# Patient Record
Sex: FEMALE | Race: WHITE | NOT HISPANIC OR LATINO
[De-identification: names, ages, dates, MRNs, and addresses within clinical notes are randomized per-mention and may not be internally consistent; named-entity substitution may affect disease eponyms.]

---

## 2020-02-25 ENCOUNTER — NON-APPOINTMENT (OUTPATIENT)
Age: 60
End: 2020-02-25

## 2020-02-25 ENCOUNTER — APPOINTMENT (OUTPATIENT)
Dept: OPHTHALMOLOGY | Facility: CLINIC | Age: 60
End: 2020-02-25
Payer: COMMERCIAL

## 2020-02-25 PROBLEM — Z00.00 ENCOUNTER FOR PREVENTIVE HEALTH EXAMINATION: Status: ACTIVE | Noted: 2020-02-25

## 2020-02-25 PROCEDURE — 92004 COMPRE OPH EXAM NEW PT 1/>: CPT

## 2020-10-13 ENCOUNTER — TRANSCRIPTION ENCOUNTER (OUTPATIENT)
Age: 60
End: 2020-10-13

## 2020-10-13 ENCOUNTER — APPOINTMENT (OUTPATIENT)
Dept: OBGYN | Facility: CLINIC | Age: 60
End: 2020-10-13
Payer: COMMERCIAL

## 2020-10-13 VITALS
TEMPERATURE: 97.6 F | HEIGHT: 63 IN | BODY MASS INDEX: 21.62 KG/M2 | WEIGHT: 122 LBS | SYSTOLIC BLOOD PRESSURE: 130 MMHG | DIASTOLIC BLOOD PRESSURE: 90 MMHG

## 2020-10-13 DIAGNOSIS — Z12.39 ENCOUNTER FOR OTHER SCREENING FOR MALIGNANT NEOPLASM OF BREAST: ICD-10-CM

## 2020-10-13 DIAGNOSIS — Z01.419 ENCOUNTER FOR GYNECOLOGICAL EXAMINATION (GENERAL) (ROUTINE) W/OUT ABNORMAL FINDINGS: ICD-10-CM

## 2020-10-13 DIAGNOSIS — Z83.438 FAMILY HISTORY OF OTHER DISORDER OF LIPOPROTEIN METABOLISM AND OTHER LIPIDEMIA: ICD-10-CM

## 2020-10-13 DIAGNOSIS — N95.2 POSTMENOPAUSAL ATROPHIC VAGINITIS: ICD-10-CM

## 2020-10-13 DIAGNOSIS — Z78.9 OTHER SPECIFIED HEALTH STATUS: ICD-10-CM

## 2020-10-13 PROCEDURE — 99386 PREV VISIT NEW AGE 40-64: CPT

## 2020-10-13 RX ORDER — ESTRADIOL 0.1 MG/G
0.1 CREAM VAGINAL
Qty: 1 | Refills: 3 | Status: ACTIVE | COMMUNITY
Start: 2020-10-13 | End: 1900-01-01

## 2020-10-13 NOTE — COUNSELING
[Nutrition/ Exercise/ Weight Management] : nutrition, exercise, weight management [Breast Self Exam] : breast self exam [STD (testing, results, tx)] : STD (testing, results, tx)

## 2020-10-13 NOTE — DISCUSSION/SUMMARY
[FreeTextEntry1] : 59 yo presents for well woman visit, has history of vaginal atrophy\par \par HCM: pap and HPV done, breast imaging referral by PMD, colon cancer screening up to date, bone density next year\par \par Vaginal atrophy: Rx sent to pharmacy\par \par -f/u 1 year/ PRN

## 2020-10-13 NOTE — PHYSICAL EXAM
[Appropriately responsive] : appropriately responsive [No Lymphadenopathy] : no lymphadenopathy [Soft] : soft [Non-tender] : non-tender [Non-distended] : non-distended [No Lesions] : no lesions [Oriented x3] : oriented x3 [Examination Of The Breasts] : a normal appearance [No Masses] : no breast masses were palpable [Vulvar Atrophy] : vulvar atrophy [Labia Majora] : normal [Labia Minora] : normal [Atrophy] : atrophy [No Bleeding] : There was no active vaginal bleeding [Normal] : normal [Anteversion] : anteverted [Uterine Adnexae] : normal [No Tenderness] : no tenderness [Nl Sphincter Tone] : normal sphincter tone [FreeTextEntry9] : no masses, no nodules

## 2020-10-13 NOTE — HISTORY OF PRESENT ILLNESS
[Patient reported mammogram was normal] : Patient reported mammogram was normal [Patient reported PAP Smear was normal] : Patient reported PAP Smear was normal [Patient reported bone density results were normal] : Patient reported bone density results were normal [Patient reported colonoscopy was normal] : Patient reported colonoscopy was normal [postmenopausal] : postmenopausal [Y] : Patient is sexually active [FreeTextEntry1] : 61 yo presents for initial gynecological visit, she has vaginal dryness and occasional bleeding with intercourse using Estrace cream. Sexually active. She is a  and has [place] some [place].  [Mammogramdate] : 2019 [PapSmeardate] : 2019 [BoneDensityDate] : 2019 [ColonoscopyDate] : 2015 [PGHxTotal] : 3 [PGHxAbortions] : 3 [No] : Patient does not have concerns regarding sex [Currently Active] : currently active [Men] : men

## 2020-10-26 LAB — HPV HIGH+LOW RISK DNA PNL CVX: NOT DETECTED

## 2023-07-07 ENCOUNTER — NON-APPOINTMENT (OUTPATIENT)
Age: 63
End: 2023-07-07

## 2023-07-10 ENCOUNTER — NON-APPOINTMENT (OUTPATIENT)
Age: 63
End: 2023-07-10

## 2023-07-11 ENCOUNTER — NON-APPOINTMENT (OUTPATIENT)
Age: 63
End: 2023-07-11

## 2023-07-11 ENCOUNTER — APPOINTMENT (OUTPATIENT)
Dept: BREAST CENTER | Facility: CLINIC | Age: 63
End: 2023-07-11
Payer: SELF-PAY

## 2023-07-11 VITALS
DIASTOLIC BLOOD PRESSURE: 61 MMHG | OXYGEN SATURATION: 97 % | HEIGHT: 63 IN | WEIGHT: 115 LBS | SYSTOLIC BLOOD PRESSURE: 157 MMHG | BODY MASS INDEX: 20.38 KG/M2 | HEART RATE: 79 BPM

## 2023-07-11 DIAGNOSIS — Z86.39 PERSONAL HISTORY OF OTHER ENDOCRINE, NUTRITIONAL AND METABOLIC DISEASE: ICD-10-CM

## 2023-07-11 DIAGNOSIS — Z80.3 FAMILY HISTORY OF MALIGNANT NEOPLASM OF BREAST: ICD-10-CM

## 2023-07-11 DIAGNOSIS — Z78.9 OTHER SPECIFIED HEALTH STATUS: ICD-10-CM

## 2023-07-11 DIAGNOSIS — Z86.79 PERSONAL HISTORY OF OTHER DISEASES OF THE CIRCULATORY SYSTEM: ICD-10-CM

## 2023-07-11 DIAGNOSIS — R92.8 OTHER ABNORMAL AND INCONCLUSIVE FINDINGS ON DIAGNOSTIC IMAGING OF BREAST: ICD-10-CM

## 2023-07-11 PROCEDURE — 99205 OFFICE O/P NEW HI 60 MIN: CPT

## 2023-07-11 RX ORDER — AMLODIPINE BESYLATE 5 MG/1
5 TABLET ORAL
Refills: 0 | Status: ACTIVE | COMMUNITY

## 2023-07-14 ENCOUNTER — OUTPATIENT (OUTPATIENT)
Dept: OUTPATIENT SERVICES | Facility: HOSPITAL | Age: 63
LOS: 1 days | End: 2023-07-14
Payer: SELF-PAY

## 2023-07-14 DIAGNOSIS — C50.912 MALIGNANT NEOPLASM OF UNSPECIFIED SITE OF LEFT FEMALE BREAST: ICD-10-CM

## 2023-07-18 ENCOUNTER — NON-APPOINTMENT (OUTPATIENT)
Age: 63
End: 2023-07-18

## 2023-07-18 ENCOUNTER — RESULT REVIEW (OUTPATIENT)
Age: 63
End: 2023-07-18

## 2023-07-18 LAB — SURGICAL PATHOLOGY STUDY: SIGNIFICANT CHANGE UP

## 2023-07-18 PROCEDURE — 88321 CONSLTJ&REPRT SLD PREP ELSWR: CPT

## 2023-07-18 NOTE — PHYSICAL EXAM
[Normocephalic] : normocephalic [EOMI] : extra ocular movement intact [Supple] : supple [Examined in the supine and seated position] : examined in the supine and seated position [Symmetrical] : symmetrical [No dominant masses] : no dominant masses in right breast  [No dominant masses] : no dominant masses left breast [No Nipple Retraction] : no left nipple retraction [No Nipple Discharge] : no left nipple discharge [No Axillary Lymphadenopathy] : no left axillary lymphadenopathy [No Rashes] : no rashes [de-identified] : Significant ecchymoses in the 3:00 to 8:00 location of the left breast.  No obvious palpable masses noted

## 2023-07-18 NOTE — ASSESSMENT
[FreeTextEntry1] : 63-year-old female with a newly diagnosed left breast invasive ductal carcinoma, ER positive, LA positive, HER2 negative multifocal and multicentric with a family history of breast cancer

## 2023-07-18 NOTE — HISTORY OF PRESENT ILLNESS
[FreeTextEntry1] : 64 yo female who presents with her significant other, Sarath, for newly diagnosed multifocal left breast invasive ductal carcinoma (ER positive [place] negative HER2 negative) and DCIS found on screening mammogram as 2 clusters of microcalcifications in the left breast 6:00 5cmFN and 3:00 5cmFN. Of note, the patient underwent a screening mammogram in 12/2022 recommending left diagnostic views of which patient completed in 6/2023, which prompted the stereotactic biopsies yielding malignant findings.  Of note, the patient has been under a significant amount of stress as she was informed by her insurance company yesterday that her plan had dropped her.  She is undergoing the reinstating process.  I did recommend that she follow-up with our patient navigator to see if there can be any assistance with the social work.  I also discussed the diagnosis with the patient at length.  I explained the significance of breast cancer as well as her biomarkers.  I recommended bilateral breast MRI to further evaluate both breasts and the extent of disease.  The patient and her significant other understand and agree.  I did review the options of breast conservation versus mastectomy.  The patient would like to attempt for breast conservation if possible.  We did also discussed that since she has a family history of breast cancer she may qualify for genetic testing.  I discussed this with the genetic counselors and she does not meet the criteria.  The patient is not interested in paying out-of-pocket for genetic testing so we will proceed without that.\par \par TNM Staging: T1N0M0\par \par Patient reports history of right breast biopsy completed over 20 years ago with reportedly benign results. Patient reports use of topical vaginal estradiol cream started 2-3 years ago, stopped several months ago.

## 2023-07-18 NOTE — PAST MEDICAL HISTORY
[Menarche Age ____] : age at menarche was [unfilled] [Menopause Age____] : age at menopause was [unfilled] [Regular Cycle Intervals] : have been regular [Total Preg ___] : G[unfilled] [Live Births ___] : P[unfilled]  [Abortions ___] : Abortions:[unfilled] [History of Hormone Replacement Treatment] : has no history of hormone replacement treatment

## 2023-07-18 NOTE — DATA REVIEWED
[FreeTextEntry1] : 12/27/22 ([facility]) b/l screening mammo: heterogenously dense. Nonspecific microcalcifications inferior posterior left breast. Diagnostic mammography is recommended. FOLLOW-UP: Additional imaging. ASSESSMENT: BI-RADS Category 0\par \par 6/3/23 ([facility]) left diag mammo: heterogenously dense. 2 clusters of calcifications seen in the left 6:00 and 3:00 positions for which stereotactic biopsy is recommended. BI-RADS 4. \par \par 6/30/23 ([facility]) left stereo biopsy x2:1. Left breast central, with Ca++[cork clip]: ductal carcinoma in situ, low to intermediate nuclear grade, solid and cribriform patterns, with lobular involvement, necrosis and calcifications.\par Addendum: Estrogen receptor (ER) POSITIVE >90%; Progesterone receptor (CO): POSITIVE 33%\par 2. Left breast, central lower, with Ca++ [hourglass clip]: well moderately differentiated invasive ductal carcinoma, measuring 0.2 cm in maximal length in this material. Ductal carcinoma in situ, intermediate nuclear grade, solid pattern, with necrosis and calcifications also present. Benign glandular calcification also present.\par Addendum: Estrogen receptor (ER) POSITIVE >90% Progesterone receptor (CO) NEGATIVE <1% HER-2 wang: 1+ NEGATIVE Ki67 1%\par Recommend definitive surgical and oncological management. Consider breast MRI for extent of disease.

## 2023-07-28 ENCOUNTER — TRANSCRIPTION ENCOUNTER (OUTPATIENT)
Age: 63
End: 2023-07-28

## 2023-07-28 ENCOUNTER — OUTPATIENT (OUTPATIENT)
Dept: OUTPATIENT SERVICES | Facility: HOSPITAL | Age: 63
LOS: 1 days | End: 2023-07-28
Payer: SELF-PAY

## 2023-07-28 ENCOUNTER — RESULT REVIEW (OUTPATIENT)
Age: 63
End: 2023-07-28

## 2023-07-28 ENCOUNTER — APPOINTMENT (OUTPATIENT)
Dept: MRI IMAGING | Facility: HOSPITAL | Age: 63
End: 2023-07-28

## 2023-07-28 PROCEDURE — 77049 MRI BREAST C-+ W/CAD BI: CPT | Mod: 26

## 2023-07-28 PROCEDURE — C8908: CPT

## 2023-07-28 PROCEDURE — C8937: CPT

## 2023-07-28 PROCEDURE — A9585: CPT

## 2023-07-31 ENCOUNTER — APPOINTMENT (OUTPATIENT)
Dept: BREAST CENTER | Facility: CLINIC | Age: 63
End: 2023-07-31
Payer: SELF-PAY

## 2023-07-31 ENCOUNTER — NON-APPOINTMENT (OUTPATIENT)
Age: 63
End: 2023-07-31

## 2023-07-31 VITALS
SYSTOLIC BLOOD PRESSURE: 158 MMHG | BODY MASS INDEX: 20.9 KG/M2 | HEART RATE: 72 BPM | WEIGHT: 118 LBS | DIASTOLIC BLOOD PRESSURE: 83 MMHG

## 2023-07-31 PROCEDURE — 99215 OFFICE O/P EST HI 40 MIN: CPT

## 2023-07-31 NOTE — ASSESSMENT
[FreeTextEntry1] : 63-year-old female with a newly diagnosed left breast invasive ductal carcinoma, ER positive, TX Negative, HER2 negative And left breast DCIS, ER positive, TX positive with a family history of breast cancer

## 2023-07-31 NOTE — PHYSICAL EXAM
[Normocephalic] : normocephalic [EOMI] : extra ocular movement intact [Supple] : supple [Examined in the supine and seated position] : examined in the supine and seated position [Symmetrical] : symmetrical [No dominant masses] : no dominant masses in right breast  [No dominant masses] : no dominant masses left breast [No Nipple Retraction] : no left nipple retraction [No Nipple Discharge] : no left nipple discharge [No Axillary Lymphadenopathy] : no left axillary lymphadenopathy [No Rashes] : no rashes [de-identified] :  No obvious palpable masses noted

## 2023-07-31 NOTE — DATA REVIEWED
[FreeTextEntry1] : 12/27/22 ([facility]) b/l screening mammo: heterogenously dense. Nonspecific microcalcifications inferior posterior left breast. Diagnostic mammography is recommended. FOLLOW-UP: Additional imaging. ASSESSMENT: BI-RADS Category 0  6/3/23 ([facility]) left diag mammo: heterogenously dense. 2 clusters of calcifications seen in the left 6:00 and 3:00 positions for which stereotactic biopsy is recommended. BI-RADS 4.   6/30/23 ([facility]) left stereo biopsy x2:1. Left breast central, with Ca++[cork clip]: ductal carcinoma in situ, low to intermediate nuclear grade, solid and cribriform patterns, with lobular involvement, necrosis and calcifications. Addendum: Estrogen receptor (ER) POSITIVE >90%; Progesterone receptor ([place]): POSITIVE 33% 2. Left breast, central lower, with Ca++ [hourglass clip]: well moderately differentiated invasive ductal carcinoma, measuring 0.2 cm in maximal length in this material. Ductal carcinoma in situ, intermediate nuclear grade, solid pattern, with necrosis and calcifications also present. Benign glandular calcification also present. Addendum: Estrogen receptor (ER) POSITIVE >90% Progesterone receptor (IN) NEGATIVE <1% HER-2 wang: 1+ NEGATIVE Ki67 1% Recommend definitive surgical and oncological management. Consider breast MRI for extent of disease.   7/28/23 ([facility]) b/l breast MRI: IMPRESSION: Unremarkable right breast, No adenopathy, Newly diagnosed left breast cancer, two sites. The clip to clip distance on the CC and ML views is about 2 cm. Breast conservation can be considered. Localization of the CORK and HOURGLASS clips can be performed with mammography guidance prior to surgery. RECOMMENDATION: Surgical or oncologic management. BI-RADS: BI-RADS 6- Known Biopsy-Proven Malignancy.

## 2023-07-31 NOTE — HISTORY OF PRESENT ILLNESS
[FreeTextEntry1] : 62 yo female who presents with her significant other, Sarath, for follow up of newly diagnosed multifocal left breast invasive ductal carcinoma (ER positive [place] negative HER2 negative) and DCIS found on screening mammogram as 2 clusters of microcalcifications in the left breast 6:00 5cmFN and 3:00 5cmFN. The patient underwent a bilateral screening mammogram on 12/27/2022.  She was found to have dense breast tissue.  Additional imaging was recommended.  She underwent left breast diagnostic mammogram and sonogram on 6/3/2023.  She was found to have 2 clusters of calcifications in the left 6:00 and 3:00 positions for which stereotactic core biopsy is recommended.  She underwent left breast stereotactic core biopsy on 6/30/2023.  The central breast returned as ductal carcinoma in situ, ER positive, CA positive.  At the left breast central lower area returned as invasive ductal carcinoma measuring 0.2 cm, ER positive, CA negative, HER2 negative.  Bilateral breast MRI was performed on 7/28/2023.  There were no additional areas of enhancement in either breast and no axillary adenopathy.  The clip to clip distance was found to be about 2 cm in breast conservation was found to be amenable. Patient reports history of right breast biopsy completed over 20 years ago with reportedly benign results. Patient reports use of topical vaginal estradiol cream started 2-3 years ago, stopped several months ago. She does have a family history of a maternal grandmother with breast cancer diagnosed at the age of 85.  On discussion with the genetic counseling she did not qualify for genetic testing even with her new diagnosis.  I discussed the diagnosis with the patient at length. We discussed the surgical options of breast conservation with lumpectomy with or without sentinel lymph node biopsy versus mastectomy with sentinel lymph node biopsy and possible axillary lymph node dissection. I explained the risks and benefits of the procedures, including but not limited to bleeding, infection, cancer recurrence, need for additional procedures, skin necrosis, loss of sensation, limitation of upper extremity range of motion, and lymphedema.     EARNESTINE DESTIN has decided on proceeding with a Left breast lumpectomy with preoperative localization and sentinel lymph node biopsy. EARNESTINE understands and agrees that a postoperative consultation with radiation oncology and medical oncology is required as they may need to undergo postoperative radiation, chemotherapy, and/or hormonal therapy. The patient has no further questions at this time.  Of note, the patient has been under a significant amount of stress as she was informed by her insurance company yesterday that her plan had dropped her.  She is undergoing the reinstating process and was connected with nurse navigation to assist in coordination with social work for assistance. The patient states that she has a trip planned in the middle of August that she would like to plan her surgery around.    TNM Staging: T1N0M0

## 2023-08-30 ENCOUNTER — NON-APPOINTMENT (OUTPATIENT)
Age: 63
End: 2023-08-30

## 2023-09-05 ENCOUNTER — APPOINTMENT (OUTPATIENT)
Dept: NUCLEAR MEDICINE | Facility: HOSPITAL | Age: 63
End: 2023-09-05
Payer: COMMERCIAL

## 2023-09-05 ENCOUNTER — RESULT REVIEW (OUTPATIENT)
Age: 63
End: 2023-09-05

## 2023-09-05 ENCOUNTER — OUTPATIENT (OUTPATIENT)
Dept: OUTPATIENT SERVICES | Facility: HOSPITAL | Age: 63
LOS: 1 days | End: 2023-09-05
Payer: COMMERCIAL

## 2023-09-05 PROCEDURE — 78195 LYMPH SYSTEM IMAGING: CPT | Mod: 26

## 2023-09-05 PROCEDURE — 78195 LYMPH SYSTEM IMAGING: CPT

## 2023-09-05 PROCEDURE — A9541: CPT

## 2023-09-06 ENCOUNTER — APPOINTMENT (OUTPATIENT)
Dept: MAMMOGRAPHY | Facility: CLINIC | Age: 63
End: 2023-09-06
Payer: COMMERCIAL

## 2023-09-06 ENCOUNTER — RESULT REVIEW (OUTPATIENT)
Age: 63
End: 2023-09-06

## 2023-09-06 ENCOUNTER — OUTPATIENT (OUTPATIENT)
Dept: OUTPATIENT SERVICES | Facility: HOSPITAL | Age: 63
LOS: 1 days | End: 2023-09-06

## 2023-09-06 ENCOUNTER — APPOINTMENT (OUTPATIENT)
Age: 63
End: 2023-09-06
Payer: COMMERCIAL

## 2023-09-06 PROCEDURE — 19282 PERQ DEVICE BREAST EA IMAG: CPT | Mod: LT

## 2023-09-06 PROCEDURE — 38525 BIOPSY/REMOVAL LYMPH NODES: CPT | Mod: LT

## 2023-09-06 PROCEDURE — 14000 TIS TRNFR TRUNK 10 SQ CM/<: CPT | Mod: LT

## 2023-09-06 PROCEDURE — 19281 PERQ DEVICE BREAST 1ST IMAG: CPT | Mod: LT

## 2023-09-06 PROCEDURE — 19301 PARTIAL MASTECTOMY: CPT | Mod: LT

## 2023-09-06 PROCEDURE — 76098 X-RAY EXAM SURGICAL SPECIMEN: CPT | Mod: 26

## 2023-09-06 PROCEDURE — 38900 IO MAP OF SENT LYMPH NODE: CPT | Mod: LT

## 2023-09-06 RX ORDER — OXYCODONE AND ACETAMINOPHEN 5; 325 MG/1; MG/1
1 TABLET ORAL
Qty: 10 | Refills: 0
Start: 2023-09-06 | End: 2023-09-07

## 2023-09-07 ENCOUNTER — RESULT REVIEW (OUTPATIENT)
Age: 63
End: 2023-09-07

## 2023-09-07 PROCEDURE — 88307 TISSUE EXAM BY PATHOLOGIST: CPT | Mod: 26

## 2023-09-07 PROCEDURE — 88305 TISSUE EXAM BY PATHOLOGIST: CPT | Mod: 26

## 2023-09-12 ENCOUNTER — NON-APPOINTMENT (OUTPATIENT)
Age: 63
End: 2023-09-12

## 2023-09-12 LAB — SURGICAL PATHOLOGY STUDY: SIGNIFICANT CHANGE UP

## 2023-09-18 ENCOUNTER — APPOINTMENT (OUTPATIENT)
Dept: BREAST CENTER | Facility: CLINIC | Age: 63
End: 2023-09-18
Payer: COMMERCIAL

## 2023-09-18 VITALS
WEIGHT: 117 LBS | HEART RATE: 81 BPM | HEIGHT: 63 IN | SYSTOLIC BLOOD PRESSURE: 131 MMHG | BODY MASS INDEX: 20.73 KG/M2 | DIASTOLIC BLOOD PRESSURE: 79 MMHG

## 2023-09-18 PROCEDURE — 99024 POSTOP FOLLOW-UP VISIT: CPT

## 2023-09-20 ENCOUNTER — APPOINTMENT (OUTPATIENT)
Dept: RADIATION ONCOLOGY | Facility: CLINIC | Age: 63
End: 2023-09-20
Payer: COMMERCIAL

## 2023-09-20 VITALS
OXYGEN SATURATION: 97 % | RESPIRATION RATE: 16 BRPM | BODY MASS INDEX: 20.73 KG/M2 | HEIGHT: 63 IN | WEIGHT: 117 LBS | SYSTOLIC BLOOD PRESSURE: 128 MMHG | DIASTOLIC BLOOD PRESSURE: 82 MMHG | HEART RATE: 74 BPM | TEMPERATURE: 98.3 F

## 2023-09-20 PROCEDURE — 99205 OFFICE O/P NEW HI 60 MIN: CPT

## 2023-09-28 ENCOUNTER — APPOINTMENT (OUTPATIENT)
Dept: HEMATOLOGY ONCOLOGY | Facility: CLINIC | Age: 63
End: 2023-09-28
Payer: COMMERCIAL

## 2023-09-28 VITALS
DIASTOLIC BLOOD PRESSURE: 77 MMHG | TEMPERATURE: 97.1 F | OXYGEN SATURATION: 98 % | BODY MASS INDEX: 20.73 KG/M2 | RESPIRATION RATE: 18 BRPM | HEART RATE: 75 BPM | HEIGHT: 63 IN | SYSTOLIC BLOOD PRESSURE: 123 MMHG | WEIGHT: 117 LBS

## 2023-09-28 PROCEDURE — 99204 OFFICE O/P NEW MOD 45 MIN: CPT

## 2023-09-29 ENCOUNTER — NON-APPOINTMENT (OUTPATIENT)
Age: 63
End: 2023-09-29

## 2023-10-13 ENCOUNTER — NON-APPOINTMENT (OUTPATIENT)
Age: 63
End: 2023-10-13

## 2023-10-18 ENCOUNTER — NON-APPOINTMENT (OUTPATIENT)
Age: 63
End: 2023-10-18

## 2023-10-20 ENCOUNTER — NON-APPOINTMENT (OUTPATIENT)
Age: 63
End: 2023-10-20

## 2023-10-23 ENCOUNTER — NON-APPOINTMENT (OUTPATIENT)
Age: 63
End: 2023-10-23

## 2023-10-26 ENCOUNTER — NON-APPOINTMENT (OUTPATIENT)
Age: 63
End: 2023-10-26

## 2023-11-01 ENCOUNTER — NON-APPOINTMENT (OUTPATIENT)
Age: 63
End: 2023-11-01

## 2023-11-22 ENCOUNTER — APPOINTMENT (OUTPATIENT)
Dept: HEMATOLOGY ONCOLOGY | Facility: CLINIC | Age: 63
End: 2023-11-22

## 2023-12-11 ENCOUNTER — NON-APPOINTMENT (OUTPATIENT)
Age: 63
End: 2023-12-11

## 2023-12-18 ENCOUNTER — APPOINTMENT (OUTPATIENT)
Dept: BREAST CENTER | Facility: CLINIC | Age: 63
End: 2023-12-18
Payer: COMMERCIAL

## 2023-12-18 VITALS
DIASTOLIC BLOOD PRESSURE: 80 MMHG | WEIGHT: 115 LBS | HEART RATE: 77 BPM | BODY MASS INDEX: 20.38 KG/M2 | HEIGHT: 63 IN | SYSTOLIC BLOOD PRESSURE: 134 MMHG

## 2023-12-18 PROCEDURE — 99213 OFFICE O/P EST LOW 20 MIN: CPT

## 2023-12-18 NOTE — PLAN
[TextEntry] : Bilateral diagnostic mammogram and sonogram to be done in May 2024 Patient perform self breast exams as instructed in the office Patient to continue to follow-up with radiation oncology as well as medical oncology Patient to follow-up in May 2024 after imaging completed

## 2023-12-18 NOTE — PHYSICAL EXAM
[Normocephalic] : normocephalic [EOMI] : extra ocular movement intact [Supple] : supple [Examined in the supine and seated position] : examined in the supine and seated position [Symmetrical] : symmetrical [No dominant masses] : no dominant masses in right breast  [No dominant masses] : no dominant masses left breast [No Nipple Retraction] : no left nipple retraction [No Nipple Discharge] : no left nipple discharge [No Axillary Lymphadenopathy] : no left axillary lymphadenopathy [No Rashes] : no rashes [No Supraclavicular Adenopathy] : no supraclavicular adenopathy [de-identified] : Well-healed periareolar lumpectomy incision as well as axillary incision

## 2023-12-18 NOTE — ASSESSMENT
[FreeTextEntry1] : 63-year-old female with personal history of left breast DCIS status postlumpectomy and September 2023

## 2023-12-18 NOTE — DATA REVIEWED
[FreeTextEntry1] : 12/27/22 ([facility]) b/l screening mammo: heterogenously dense. Nonspecific microcalcifications inferior posterior left breast. Diagnostic mammography is recommended. FOLLOW-UP: Additional imaging. ASSESSMENT: BI-RADS Category 0  6/3/23 (R) left diag mammo: heterogenously dense. 2 clusters of calcifications seen in the left 6:00 and 3:00 positions for which stereotactic biopsy is recommended. BI-RADS 4.   6/30/23 ([facility]) left stereo biopsy x2:1. Left breast central, with Ca++[cork clip]: ductal carcinoma in situ, low to intermediate nuclear grade, solid and cribriform patterns, with lobular involvement, necrosis and calcifications. Addendum: Estrogen receptor (ER) POSITIVE >90%; Progesterone receptor ([place]): POSITIVE 33% 2. Left breast, central lower, with Ca++ [hourglass clip]: well moderately differentiated invasive ductal carcinoma, measuring 0.2 cm in maximal length in this material. Ductal carcinoma in situ, intermediate nuclear grade, solid pattern, with necrosis and calcifications also present. Benign glandular calcification also present. Addendum: Estrogen receptor (ER) POSITIVE >90% Progesterone receptor ([place]) NEGATIVE <1% HER-2 wang: 1+ NEGATIVE Ki67 1% Recommend definitive surgical and oncological management. Consider breast MRI for extent of disease.   7/28/23 ([facility]) b/l breast MRI: IMPRESSION: Unremarkable right breast, No adenopathy, Newly diagnosed left breast cancer, two sites. The clip to clip distance on the CC and ML views is about 2 cm. Breast conservation can be considered. Localization of the CORK and HOURGLASS clips can be performed with mammography guidance prior to surgery. RECOMMENDATION: Surgical or oncologic management. BI-RADS: BI-RADS 6- Known Biopsy-Proven Malignancy.

## 2023-12-18 NOTE — HISTORY OF PRESENT ILLNESS
[FreeTextEntry1] : 64 yo female who presents with her significant other, Sarath, for follow-up of multifocal left breast invasive ductal carcinoma (ER positive [place] negative HER2 negative) and DCIS s/p left breast lumpectomy x 2, left SLNB on 9/6/23 final surgical pathology yielded no residual invasive carcinoma, scattered foci of DCIS, 0/1 lymph node negative for tumor. [id number]. The patient is under the radiation oncology care of Dr. Holbrook she completed XRT to the left breast on 11/1/2023. Patient is under the medical oncology care of Dr. Cano and discussed the role of adjuvant endocrine therapy which was recommended for patient to start following radiation therapy. Patient has not started on the endocrine therapy as she is on the fence regarding taking it.  She was encouraged to do so.  She was advised to follow-up with Dr. Cano regarding this.  Discussed with the patient that given her last bilateral screening was in 12/2022 and she is recently status post radiation therapy recommend patient proceed with her bilateral diagnostic mammogram/US in March 2024 however patient will be away in [place] until May so she will proceed with her imaging at that time. Patient is looking for a new gynecologist to establish care with I have provided her with the name of Dr. Karmen Del Angel.   Cancer History: left breast cancer found on screening mammogram as 2 clusters of microcalcifications in the left breast 6:00 5cmFN and 3:00 5cmFN. The patient underwent a bilateral screening mammogram on 12/27/2022. She was found to have dense breast tissue. Additional imaging was recommended. She underwent left breast diagnostic mammogram and sonogram on 6/3/2023. She was found to have 2 clusters of calcifications in the left 6:00 and 3:00 positions for which stereotactic core biopsy is recommended. She underwent left breast stereotactic core biopsy on 6/30/2023. The central breast returned as ductal carcinoma in situ, ER positive, AZ positive. At the left breast central lower area returned as invasive ductal carcinoma measuring 0.2 cm, ER positive, AZ negative, HER2 negative. Bilateral breast MRI was performed on 7/28/2023. There were no additional areas of enhancement in either breast and no axillary adenopathy. The clip to clip distance was found to be about 2 cm in breast conservation was found to be amenable. Patient reports history of right breast biopsy completed over 20 years ago with reportedly benign results. Patient reports use of topical vaginal estradiol cream started 2-3 years ago, stopped several months ago. She does have a family history of a maternal grandmother with breast cancer diagnosed at the age of 85. On discussion with the genetic counseling she did not qualify for genetic testing even with her new diagnosis. She underwent left breast lumpectomy and sentinel lymph node biopsy on 9/6/2023. Final pathology returned as TNM Staging: T1N0M0.

## 2023-12-19 ENCOUNTER — APPOINTMENT (OUTPATIENT)
Dept: RADIATION ONCOLOGY | Facility: CLINIC | Age: 63
End: 2023-12-19

## 2024-02-25 NOTE — DISEASE MANAGEMENT
[TTNM] : is [NTNM] : 0(sn) [MTNM] : X [de-identified] : [id number] [de-identified] : [id number] [de-identified] : left breast

## 2024-02-25 NOTE — HISTORY OF PRESENT ILLNESS
[FreeTextEntry1] : 64 y/o F newly diagnosed with LEFT ER+/CO-/HER2- IDC and ER/CO+ DCIS ([id number]) s/p 9/7/23 L BCS (2mm total disease, G1-2, DCIS+, margins-, 0/1 SN) presents per Dr. Mena to discuss radiation therapy.   11/1/23: Final OTV 3471cGy/4005cGy, 13/15fx to Left breast. Patient states she continues to have mild fatigue at the end of the day. She reports mild tenderness and erythema in the left breast. She is using the creams. She reports a period of nausea and dehydration yesterday that lasted 10 minutes and was relieved with hydration. Continue radiation.   10/18/23: OTV 801Gy/ 3fx to left breast.  Patient reports good energy level and appetite, denies fatigue. Skin is intact, patient not yet using cream. Encouraged patient to used skin lotion at site BID with mometasone (prescribed). Continue radiation.   9/20/2023: Consultation Today the patient reports feeling well overall. Notes tenderness in L axilla following aspiration of seroma on 9/18/23 with breast surg. She denies fever, chills, N/V, or loss of appetite. Otherwise denies appreciable breast symptoms.   History of Present Illness: ________________________________  12/27/22 ([facility]) b/l screening mammo: heterogenously dense. Nonspecific microcalcifications inferior posterior left breast. Diagnostic mammography is recommended. FOLLOW-UP: Additional imaging. ASSESSMENT: BI-RADS Category 0  6/3/23 ([facility]) left diag mammo: heterogenously dense. 2 clusters of calcifications seen in the left 6:00 and 3:00 positions for which stereotactic biopsy is recommended. BI-RADS 4.  6/30/23 ([facility]) left stereo biopsy x2:1. Left breast central, with Ca++[cork clip]: ductal carcinoma in situ, low to intermediate nuclear grade, solid and cribriform patterns, with lobular involvement, necrosis and calcifications. Addendum: Estrogen receptor (ER) POSITIVE >90%; Progesterone receptor (CO): POSITIVE 33% 2. Left breast, central lower, with Ca++ [hourglass clip]: well moderately differentiated invasive ductal carcinoma, measuring 0.2 cm in maximal length in this material. Ductal carcinoma in situ, intermediate nuclear grade, solid pattern, with necrosis and calcifications also present. Benign glandular calcification also present. Addendum: Estrogen receptor (ER) POSITIVE >90% Progesterone receptor (CO) NEGATIVE <1% HER-2 wang: 1+ NEGATIVE Ki67 1% Recommend definitive surgical and oncological management. Consider breast MRI for extent of disease.  7/28/23 ([facility]) b/l breast MRI: IMPRESSION: Unremarkable right breast, No adenopathy, Newly diagnosed left breast cancer, two sites. The clip to clip distance on the CC and ML views is about 2 cm. Breast conservation can be considered. Localization of the CORK and HOURGLASS clips can be performed with mammography guidance prior to surgery. RECOMMENDATION: Surgical or oncologic management. BI-RADS: BI-RADS 6- Known Biopsy-Proven Malignancy.  9/6/23 left breast lumpectomy x 2, left SLNB; final surgical pathology yielded no residual invasive carcinoma, scattered foci of DCIS, 0/1 lymph node negative for tumor. [id number].   9/18/23 in-office aspiration of L axilla seroma

## 2024-05-13 ENCOUNTER — APPOINTMENT (OUTPATIENT)
Dept: BREAST CENTER | Facility: CLINIC | Age: 64
End: 2024-05-13
Payer: MEDICARE

## 2024-05-13 VITALS
HEIGHT: 63 IN | HEART RATE: 60 BPM | WEIGHT: 120 LBS | DIASTOLIC BLOOD PRESSURE: 70 MMHG | BODY MASS INDEX: 21.26 KG/M2 | SYSTOLIC BLOOD PRESSURE: 136 MMHG

## 2024-05-13 DIAGNOSIS — Z08 ENCOUNTER FOR FOLLOW-UP EXAMINATION AFTER COMPLETED TREATMENT FOR MALIGNANT NEOPLASM: ICD-10-CM

## 2024-05-13 DIAGNOSIS — Z85.3 ENCOUNTER FOR FOLLOW-UP EXAMINATION AFTER COMPLETED TREATMENT FOR MALIGNANT NEOPLASM: ICD-10-CM

## 2024-05-13 DIAGNOSIS — Z85.3 PERSONAL HISTORY OF MALIGNANT NEOPLASM OF BREAST: ICD-10-CM

## 2024-05-13 PROCEDURE — 99213 OFFICE O/P EST LOW 20 MIN: CPT

## 2024-05-13 RX ORDER — FLUVOXAMINE MALEATE 25 MG/1
25 TABLET, FILM COATED ORAL
Refills: 0 | Status: DISCONTINUED | COMMUNITY
End: 2024-05-13

## 2024-05-13 RX ORDER — MOMETASONE FUROATE 1 MG/G
0.1 CREAM TOPICAL
Qty: 1 | Refills: 2 | Status: DISCONTINUED | COMMUNITY
Start: 2023-10-18 | End: 2024-05-13

## 2024-05-13 NOTE — DATA REVIEWED
[FreeTextEntry1] : 12/27/22 ([facility]) b/l screening mammo: heterogenously dense. Nonspecific microcalcifications inferior posterior left breast. Diagnostic mammography is recommended. FOLLOW-UP: Additional imaging. ASSESSMENT: BI-RADS Category 0  6/3/23 ([facility]) left diag mammo: heterogenously dense. 2 clusters of calcifications seen in the left 6:00 and 3:00 positions for which stereotactic biopsy is recommended. BI-RADS 4.   6/30/23 ([facility]) left stereo biopsy x2:1. Left breast central, with Ca++[cork clip]: ductal carcinoma in situ, low to intermediate nuclear grade, solid and cribriform patterns, with lobular involvement, necrosis and calcifications. Addendum: Estrogen receptor (ER) POSITIVE >90%; Progesterone receptor ([place]): POSITIVE 33% 2. Left breast, central lower, with Ca++ [hourglass clip]: well moderately differentiated invasive ductal carcinoma, measuring 0.2 cm in maximal length in this material. Ductal carcinoma in situ, intermediate nuclear grade, solid pattern, with necrosis and calcifications also present. Benign glandular calcification also present. Addendum: Estrogen receptor (ER) POSITIVE >90% Progesterone receptor ([place]) NEGATIVE <1% HER-2 wang: 1+ NEGATIVE Ki67 1% Recommend definitive surgical and oncological management. Consider breast MRI for extent of disease.   7/28/23 ([facility]) b/l breast MRI: IMPRESSION: Unremarkable right breast, No adenopathy, Newly diagnosed left breast cancer, two sites. The clip to clip distance on the CC and ML views is about 2 cm. Breast conservation can be considered. Localization of the CORK and HOURGLASS clips can be performed with mammography guidance prior to surgery. RECOMMENDATION: Surgical or oncologic management. BI-RADS: BI-RADS 6- Known Biopsy-Proven Malignancy.   5/13/24 ([facility]) B/l diag mammo and US: heterogeneously dense. Post-lumpectomy changes on the left. No suspicous findings. BI-RADS 2.

## 2024-05-13 NOTE — PLAN
[TextEntry] : Bilateral screening mammogram and sonogram due in May 2025 Patient to continue to follow-up with medical oncology as well as radiation oncology Patient to perform self-breast exams as instructed in the office. Patient to follow-up in 1 year after imaging completed.

## 2024-05-13 NOTE — HISTORY OF PRESENT ILLNESS
[FreeTextEntry1] : 63 yo female presents with her  for follow-up of multifocal left breast invasive ductal carcinoma (ER positive VA negative HER2 negative) and DCIS s/p left breast lumpectomy x 2, left SLNB on 9/6/23.  Final surgical pathology yielded no residual invasive carcinoma, scattered foci of DCIS, 0/1 lymph node negative for tumor. [id number]. The patient is under the radiation oncology care of Dr. Holbrook she completed XRT to the left breast on 11/1/2023. Patient is under the medical oncology care of Dr. Cano and discussed the role of adjuvant endocrine therapy which was recommended for patient to start following radiation therapy. Patient has not started on the endocrine therapy as she is on the fence regarding taking it.  We discussed this at length.  I encouraged the patient to start taking the endocrine therapy.  She is still unsure at this time.  I did recommend that she follow-up with medical oncology to discuss this further.  The patient did state that she also had an endoscopy as well as a colonoscopy recently that were benign.  Breast imaging that was performed today returned as benign as seen below.  We did discuss that she has a persistent seroma which will likely be there permanently.  She does states she has occasional discomfort in the area.  I recommended warm compresses and massage.  Cancer History: left breast cancer found on screening mammogram as 2 clusters of microcalcifications in the left breast 6:00 5cmFN and 3:00 5cmFN. The patient underwent a bilateral screening mammogram on 12/27/2022. She was found to have dense breast tissue. Additional imaging was recommended. She underwent left breast diagnostic mammogram and sonogram on 6/3/2023. She was found to have 2 clusters of calcifications in the left 6:00 and 3:00 positions for which stereotactic core biopsy is recommended. She underwent left breast stereotactic core biopsy on 6/30/2023. The central breast returned as ductal carcinoma in situ, ER positive, VA positive. At the left breast central lower area returned as invasive ductal carcinoma measuring 0.2 cm, ER positive, VA negative, HER2 negative. Bilateral breast MRI was performed on 7/28/2023. There were no additional areas of enhancement in either breast and no axillary adenopathy. The clip to clip distance was found to be about 2 cm in breast conservation was found to be amenable. Patient reports history of right breast biopsy completed over 20 years ago with reportedly benign results. Patient reports use of topical vaginal estradiol cream started 2-3 years ago, stopped several months ago. She does have a family history of a maternal grandmother with breast cancer diagnosed at the age of 85. On discussion with the genetic counseling she did not qualify for genetic testing even with her new diagnosis. She underwent left breast lumpectomy and sentinel lymph node biopsy on 9/6/2023. Final pathology returned as TNM Staging: [id number].

## 2024-05-13 NOTE — PHYSICAL EXAM
[Normocephalic] : normocephalic [EOMI] : extra ocular movement intact [Supple] : supple [No Supraclavicular Adenopathy] : no supraclavicular adenopathy [Examined in the supine and seated position] : examined in the supine and seated position [Symmetrical] : symmetrical [No dominant masses] : no dominant masses in right breast  [No dominant masses] : no dominant masses left breast [No Nipple Retraction] : no left nipple retraction [No Nipple Discharge] : no left nipple discharge [No Axillary Lymphadenopathy] : no left axillary lymphadenopathy [No Rashes] : no rashes [de-identified] : Well-healed periareolar lumpectomy incision as well as axillary incision

## 2024-06-12 ENCOUNTER — APPOINTMENT (OUTPATIENT)
Dept: HEMATOLOGY ONCOLOGY | Facility: CLINIC | Age: 64
End: 2024-06-12
Payer: MEDICARE

## 2024-06-12 VITALS
RESPIRATION RATE: 18 BRPM | SYSTOLIC BLOOD PRESSURE: 117 MMHG | HEART RATE: 82 BPM | WEIGHT: 118 LBS | OXYGEN SATURATION: 97 % | DIASTOLIC BLOOD PRESSURE: 70 MMHG | TEMPERATURE: 98.6 F | BODY MASS INDEX: 20.91 KG/M2 | HEIGHT: 63 IN

## 2024-06-12 DIAGNOSIS — C50.912 MALIGNANT NEOPLASM OF UNSPECIFIED SITE OF LEFT FEMALE BREAST: ICD-10-CM

## 2024-06-12 DIAGNOSIS — Z98.890 OTHER SPECIFIED POSTPROCEDURAL STATES: ICD-10-CM

## 2024-06-12 PROCEDURE — 99203 OFFICE O/P NEW LOW 30 MIN: CPT

## 2024-06-12 RX ORDER — TAMOXIFEN CITRATE 20 MG/1
20 TABLET, FILM COATED ORAL DAILY
Qty: 90 | Refills: 3 | Status: ACTIVE | COMMUNITY
Start: 2024-06-12 | End: 1900-01-01

## 2024-06-12 RX ORDER — FLUVOXAMINE MALEATE 25 MG/1
25 TABLET, FILM COATED ORAL
Qty: 30 | Refills: 0 | Status: ACTIVE | COMMUNITY
Start: 2024-06-12

## 2024-06-12 NOTE — ASSESSMENT
[FreeTextEntry1] : 64 yo female who presents with her significant other, Sarath, for initial consult of multifocal left breast invasive ductal carcinoma (ER >90% positive SD <1% negative HER2 1+ negative) and DCIS s/p left breast lumpectomy x 2, left SLNB on 9/6/23 final surgical pathology resulted in no residual invasive carcinoma, scattered foci of DCIS, 0/1 lymph node negative for tumor.  pT1a pN0.  Reviewed surgical pathology result with patient in detail recommending adjuvant endocrine therapy and reviewing the fact that for such a small invasive breast cancer chemotherapy is not recommended. I discussed that given a small amount of invasive hormone receptor positive disease there is a small risk of distant disease recurrence and adjuvant care is recommended to reduce risk of distant recurrence. I recommended antiestrogen therapy with an aromatase inhibitor vs Tamoxifen given reported history of osteoporosis which remains untreated. Discussed the two different antiestrogen therapy options and their respective risks (Tamoxifen with small risk of VTE, cataracts, and endometrial ca while helping improve bone density vs Arimidex which can worsen bone density with both agents associated with menopausal symptoms).  Also reviewed that the use of topical vaginal estradiol cream is controversial given a degree of systemic absorption. advised on limited use if absolutely necessary and ideally considering alternative agents/lubricants.  Pt reports DEXA within past 1.5yr and being diagnosed with osteoporosis; she is against AI, declines treatment with Arimidex. I reviewed that next best option would be Tamoxifen which does not reduce bone density but can be associated with menopausal symptoms such as hot flashes and a small risk of VTE, cataracts and endometrial ca for which patient needs to be seen for an annual exam with a GYN and ophthalmologist.   Patient in agreement to proceed with Tamoxifen 20mg/day, reviewed that treatment is for five years. (Tamoxifen does not interact with patients antidepressant, ok to continue antidepressant she is currently on)  5/2024 mammo diagnostic b/l Birads 2. Asked pt to continue regular follow ups with her PMD and breast surgeon.  Pt reports recent colonoscopy in 2024 ok as per patient.   RTC in 2-3 mo for follow up with Dr Turner (as I'm leaving the practice).  person

## 2024-06-12 NOTE — HISTORY OF PRESENT ILLNESS
[FreeTextEntry1] : 65 yo female who presents with her significant other, Sarath, for initial consult of multifocal left breast invasive ductal carcinoma (ER >90% positive DC <1% negative HER2 1+ negative) and DCIS s/p left breast lumpectomy x 2, left SLNB on 9/6/23 final surgical pathology resulted in no residual invasive carcinoma, scattered foci of DCIS, 0/1 lymph node negative for tumor. Here today for f/u.   [id number].   Pt c/o mild discomfort in the left axilla and a seroma was identified on physical exam s/p aspiration.   Cancer History:  left breast cancer found on screening mammogram as 2 clusters of microcalcifications in the left breast 6:00 5cmFN and 3:00 5cmFN. The patient underwent a bilateral screening mammogram on 12/27/2022.  She was found to have dense breast tissue.  Additional imaging was recommended.  She underwent left breast diagnostic mammogram and sonogram on 6/3/2023.  She was found to have 2 clusters of calcifications in the left 6:00 and 3:00 positions for which stereotactic core biopsy is recommended.  She underwent left breast stereotactic core biopsy on 6/30/2023.  The central breast returned as ductal carcinoma in situ, ER positive, DC positive.  At the left breast central lower area returned as invasive ductal carcinoma measuring 0.2 cm, ER positive, DC negative, HER2 negative.  Bilateral breast MRI was performed on 7/28/2023.  There were no additional areas of enhancement in either breast and no axillary adenopathy.  The clip to clip distance was found to be about 2 cm in breast conservation was found to be amenable. Patient reports history of right breast biopsy completed over 20 years ago with reportedly benign results.  Of note, patient uses a topical vaginal estradiol cream on and off over the course of past 2-3 years ago.   FHx - maternal grandmother with breast cancer diagnosed at the age of 85.  On discussion with the genetic counseling she did not qualify for genetic testing even with her new diagnosis.     [de-identified] : Pt returns after being asked by her breast surgeon to reconsider Tamoxifen or an AI.  Completed adj RT in 11/2023.  Reports feeling well.  5/2024 mammo diagnostic b/l Birads 2.

## 2024-06-20 ENCOUNTER — APPOINTMENT (OUTPATIENT)
Dept: ORTHOPEDIC SURGERY | Facility: CLINIC | Age: 64
End: 2024-06-20

## 2024-10-16 DIAGNOSIS — I10 ESSENTIAL (PRIMARY) HYPERTENSION: ICD-10-CM

## 2024-10-16 DIAGNOSIS — E78.5 HYPERLIPIDEMIA, UNSPECIFIED: ICD-10-CM

## 2024-10-17 ENCOUNTER — APPOINTMENT (OUTPATIENT)
Dept: HEMATOLOGY ONCOLOGY | Facility: CLINIC | Age: 64
End: 2024-10-17
Payer: MEDICARE

## 2024-10-17 ENCOUNTER — NON-APPOINTMENT (OUTPATIENT)
Age: 64
End: 2024-10-17

## 2024-10-17 VITALS
WEIGHT: 119.25 LBS | DIASTOLIC BLOOD PRESSURE: 76 MMHG | BODY MASS INDEX: 22.23 KG/M2 | OXYGEN SATURATION: 97 % | TEMPERATURE: 98.4 F | HEART RATE: 73 BPM | RESPIRATION RATE: 18 BRPM | SYSTOLIC BLOOD PRESSURE: 128 MMHG | HEIGHT: 61.5 IN

## 2024-10-17 DIAGNOSIS — Z92.29 PERSONAL HISTORY OF OTHER DRUG THERAPY: ICD-10-CM

## 2024-10-17 DIAGNOSIS — Z85.3 ENCOUNTER FOR FOLLOW-UP EXAMINATION AFTER COMPLETED TREATMENT FOR MALIGNANT NEOPLASM: ICD-10-CM

## 2024-10-17 DIAGNOSIS — C50.912 MALIGNANT NEOPLASM OF UNSPECIFIED SITE OF LEFT FEMALE BREAST: ICD-10-CM

## 2024-10-17 DIAGNOSIS — Z08 ENCOUNTER FOR FOLLOW-UP EXAMINATION AFTER COMPLETED TREATMENT FOR MALIGNANT NEOPLASM: ICD-10-CM

## 2024-10-17 DIAGNOSIS — N95.1 MENOPAUSAL AND FEMALE CLIMACTERIC STATES: ICD-10-CM

## 2024-10-17 DIAGNOSIS — Z86.79 PERSONAL HISTORY OF OTHER DISEASES OF THE CIRCULATORY SYSTEM: ICD-10-CM

## 2024-10-17 DIAGNOSIS — M81.0 AGE-RELATED OSTEOPOROSIS W/OUT CURRENT PATHOLOGICAL FRACTURE: ICD-10-CM

## 2024-10-17 DIAGNOSIS — N95.2 POSTMENOPAUSAL ATROPHIC VAGINITIS: ICD-10-CM

## 2024-10-17 DIAGNOSIS — Z86.39 PERSONAL HISTORY OF OTHER ENDOCRINE, NUTRITIONAL AND METABOLIC DISEASE: ICD-10-CM

## 2024-10-17 DIAGNOSIS — Z98.890 OTHER SPECIFIED POSTPROCEDURAL STATES: ICD-10-CM

## 2024-10-17 PROCEDURE — G2211 COMPLEX E/M VISIT ADD ON: CPT

## 2024-10-17 PROCEDURE — 99215 OFFICE O/P EST HI 40 MIN: CPT

## 2024-10-17 PROCEDURE — G2212 PROLONG OUTPT/OFFICE VIS: CPT

## 2024-10-17 RX ORDER — ESTRADIOL 0.1 MG/G
0.1 CREAM VAGINAL
Qty: 1 | Refills: 3 | Status: ACTIVE | COMMUNITY
Start: 2024-10-17 | End: 1900-01-01

## 2024-10-19 PROBLEM — Z86.39 HISTORY OF HYPERLIPIDEMIA: Status: RESOLVED | Noted: 2020-10-13 | Resolved: 2024-10-19

## 2024-10-19 PROBLEM — M81.0 OSTEOPOROSIS WITHOUT CURRENT PATHOLOGICAL FRACTURE, UNSPECIFIED OSTEOPOROSIS TYPE: Status: ACTIVE | Noted: 2024-10-19

## 2024-10-19 PROBLEM — Z92.29 H/O TAMOXIFEN THERAPY: Status: ACTIVE | Noted: 2024-10-19

## 2024-10-19 PROBLEM — N95.1 MENOPAUSAL VAGINAL DRYNESS: Status: ACTIVE | Noted: 2024-10-19

## 2024-10-19 PROBLEM — Z86.79 HISTORY OF HYPERTENSION: Status: RESOLVED | Noted: 2023-07-11 | Resolved: 2024-10-19

## 2024-12-16 ENCOUNTER — NON-APPOINTMENT (OUTPATIENT)
Age: 64
End: 2024-12-16

## 2025-05-14 ENCOUNTER — NON-APPOINTMENT (OUTPATIENT)
Age: 65
End: 2025-05-14

## 2025-05-14 ENCOUNTER — APPOINTMENT (OUTPATIENT)
Dept: HEMATOLOGY ONCOLOGY | Facility: CLINIC | Age: 65
End: 2025-05-14
Payer: MEDICARE

## 2025-05-14 VITALS
BODY MASS INDEX: 23.3 KG/M2 | TEMPERATURE: 98.3 F | DIASTOLIC BLOOD PRESSURE: 85 MMHG | HEIGHT: 61.5 IN | HEART RATE: 71 BPM | OXYGEN SATURATION: 98 % | RESPIRATION RATE: 18 BRPM | SYSTOLIC BLOOD PRESSURE: 132 MMHG | WEIGHT: 125 LBS

## 2025-05-14 DIAGNOSIS — Z13.89 ENCOUNTER FOR SCREENING FOR OTHER DISORDER: ICD-10-CM

## 2025-05-14 DIAGNOSIS — C50.912 MALIGNANT NEOPLASM OF UNSPECIFIED SITE OF LEFT FEMALE BREAST: ICD-10-CM

## 2025-05-14 DIAGNOSIS — Z85.3 PERSONAL HISTORY OF MALIGNANT NEOPLASM OF BREAST: ICD-10-CM

## 2025-05-14 DIAGNOSIS — Z92.29 PERSONAL HISTORY OF OTHER DRUG THERAPY: ICD-10-CM

## 2025-05-14 DIAGNOSIS — N95.1 MENOPAUSAL AND FEMALE CLIMACTERIC STATES: ICD-10-CM

## 2025-05-14 DIAGNOSIS — M81.0 AGE-RELATED OSTEOPOROSIS W/OUT CURRENT PATHOLOGICAL FRACTURE: ICD-10-CM

## 2025-05-14 LAB
ALBUMIN SERPL ELPH-MCNC: 3.6 G/DL
ALP BLD-CCNC: 55 U/L
ALT SERPL-CCNC: 21 U/L
ANION GAP SERPL CALC-SCNC: 5 MMOL/L
AST SERPL-CCNC: 26 U/L
BILIRUB SERPL-MCNC: 0.7 MG/DL
BUN SERPL-MCNC: 13 MG/DL
CALCIUM SERPL-MCNC: 9.4 MG/DL
CHLORIDE SERPL-SCNC: 108 MMOL/L
CO2 SERPL-SCNC: 28 MMOL/L
CREAT SERPL-MCNC: 0.8 MG/DL
EGFRCR SERPLBLD CKD-EPI 2021: 81 ML/MIN/1.73M2
GLUCOSE SERPL-MCNC: 90 MG/DL
HCT VFR BLD CALC: 42.4 %
HGB BLD-MCNC: 14.4 G/DL
LYMPHOCYTES # BLD AUTO: 1.6 K/UL
LYMPHOCYTES NFR BLD AUTO: 37.4 %
MAN DIFF?: NO
MCHC RBC-ENTMCNC: 29.1 PG
MCHC RBC-ENTMCNC: 34 G/DL
MCV RBC AUTO: 85.8 FL
NEUTROPHILS # BLD AUTO: 2 K/UL
NEUTROPHILS NFR BLD AUTO: 47.2 %
PLATELET # BLD AUTO: 212 K/UL
POTASSIUM SERPL-SCNC: 5 MMOL/L
PROT SERPL-MCNC: 7.1 G/DL
RBC # BLD: 4.94 M/UL
RBC # FLD: 13.2 %
SODIUM SERPL-SCNC: 141 MMOL/L
WBC # FLD AUTO: 4.3 K/UL

## 2025-05-14 PROCEDURE — 36415 COLL VENOUS BLD VENIPUNCTURE: CPT

## 2025-05-14 PROCEDURE — 99215 OFFICE O/P EST HI 40 MIN: CPT | Mod: 25

## 2025-05-14 RX ORDER — ESTRADIOL 0.1 MG/G
0.1 CREAM VAGINAL
Qty: 1 | Refills: 3 | Status: ACTIVE | COMMUNITY
Start: 2025-05-14

## 2025-05-14 RX ORDER — ESTRADIOL 0.1 MG/G
0.1 CREAM VAGINAL
Qty: 1 | Refills: 6 | Status: ACTIVE | COMMUNITY
Start: 2025-05-14 | End: 1900-01-01

## 2025-05-15 ENCOUNTER — NON-APPOINTMENT (OUTPATIENT)
Age: 65
End: 2025-05-15

## 2025-05-15 LAB
25(OH)D3 SERPL-MCNC: 67.5 NG/ML
CALCIUM SERPL-MCNC: 9.3 MG/DL
PARATHYROID HORMONE INTACT: 56 PG/ML
TSH SERPL-ACNC: 0.79 UIU/ML

## 2025-05-16 ENCOUNTER — NON-APPOINTMENT (OUTPATIENT)
Age: 65
End: 2025-05-16

## 2025-05-27 ENCOUNTER — NON-APPOINTMENT (OUTPATIENT)
Age: 65
End: 2025-05-27

## 2025-05-27 ENCOUNTER — APPOINTMENT (OUTPATIENT)
Dept: BREAST CENTER | Facility: CLINIC | Age: 65
End: 2025-05-27
Payer: MEDICARE

## 2025-05-27 VITALS — WEIGHT: 122 LBS | HEIGHT: 61.5 IN | BODY MASS INDEX: 22.74 KG/M2

## 2025-05-27 DIAGNOSIS — C50.912 MALIGNANT NEOPLASM OF UNSPECIFIED SITE OF LEFT FEMALE BREAST: ICD-10-CM

## 2025-05-27 DIAGNOSIS — R92.8 OTHER ABNORMAL AND INCONCLUSIVE FINDINGS ON DIAGNOSTIC IMAGING OF BREAST: ICD-10-CM

## 2025-05-27 DIAGNOSIS — Z92.29 PERSONAL HISTORY OF OTHER DRUG THERAPY: ICD-10-CM

## 2025-05-27 DIAGNOSIS — Z08 ENCOUNTER FOR FOLLOW-UP EXAMINATION AFTER COMPLETED TREATMENT FOR MALIGNANT NEOPLASM: ICD-10-CM

## 2025-05-27 DIAGNOSIS — R92.30 DENSE BREASTS, UNSPECIFIED: ICD-10-CM

## 2025-05-27 DIAGNOSIS — Z98.890 OTHER SPECIFIED POSTPROCEDURAL STATES: ICD-10-CM

## 2025-05-27 DIAGNOSIS — Z85.3 ENCOUNTER FOR FOLLOW-UP EXAMINATION AFTER COMPLETED TREATMENT FOR MALIGNANT NEOPLASM: ICD-10-CM

## 2025-05-27 DIAGNOSIS — Z85.3 PERSONAL HISTORY OF MALIGNANT NEOPLASM OF BREAST: ICD-10-CM

## 2025-05-27 PROCEDURE — 99213 OFFICE O/P EST LOW 20 MIN: CPT

## 2025-05-27 RX ORDER — ASCORBIC ACID 500 MG
TABLET ORAL
Refills: 0 | Status: ACTIVE | COMMUNITY

## 2025-06-11 ENCOUNTER — RX RENEWAL (OUTPATIENT)
Age: 65
End: 2025-06-11

## 2025-08-04 RX ORDER — TAMOXIFEN CITRATE 20 MG/1
20 TABLET, FILM COATED ORAL
Qty: 30 | Refills: 0 | Status: ACTIVE | COMMUNITY
Start: 2025-08-04 | End: 1900-01-01

## 2025-08-13 ENCOUNTER — APPOINTMENT (OUTPATIENT)
Dept: HEMATOLOGY ONCOLOGY | Facility: CLINIC | Age: 65
End: 2025-08-13

## 2025-08-25 ENCOUNTER — NON-APPOINTMENT (OUTPATIENT)
Age: 65
End: 2025-08-25

## 2025-08-27 ENCOUNTER — RX CHANGE (OUTPATIENT)
Age: 65
End: 2025-08-27

## 2025-08-27 RX ORDER — TAMOXIFEN CITRATE 20 MG/1
20 TABLET, FILM COATED ORAL
Qty: 90 | Refills: 1 | Status: ACTIVE | COMMUNITY
Start: 1900-01-01 | End: 1900-01-01

## 2025-09-03 ENCOUNTER — APPOINTMENT (OUTPATIENT)
Dept: ENDOCRINOLOGY | Facility: CLINIC | Age: 65
End: 2025-09-03
Payer: MEDICARE

## 2025-09-03 ENCOUNTER — NON-APPOINTMENT (OUTPATIENT)
Age: 65
End: 2025-09-03

## 2025-09-03 VITALS
DIASTOLIC BLOOD PRESSURE: 80 MMHG | WEIGHT: 123 LBS | SYSTOLIC BLOOD PRESSURE: 126 MMHG | BODY MASS INDEX: 22.86 KG/M2 | HEART RATE: 81 BPM

## 2025-09-03 DIAGNOSIS — M81.0 AGE-RELATED OSTEOPOROSIS W/OUT CURRENT PATHOLOGICAL FRACTURE: ICD-10-CM

## 2025-09-03 PROCEDURE — 99205 OFFICE O/P NEW HI 60 MIN: CPT | Mod: 25

## 2025-09-03 PROCEDURE — 36415 COLL VENOUS BLD VENIPUNCTURE: CPT

## 2025-09-04 ENCOUNTER — APPOINTMENT (OUTPATIENT)
Dept: DERMATOLOGY | Facility: CLINIC | Age: 65
End: 2025-09-04
Payer: MEDICARE

## 2025-09-04 DIAGNOSIS — L82.1 OTHER SEBORRHEIC KERATOSIS: ICD-10-CM

## 2025-09-04 DIAGNOSIS — D22.9 MELANOCYTIC NEVI, UNSPECIFIED: ICD-10-CM

## 2025-09-04 LAB
25(OH)D3 SERPL-MCNC: 51.1 NG/ML
ALBUMIN SERPL ELPH-MCNC: 4.5 G/DL
ALP BLD-CCNC: 74 U/L
ALT SERPL-CCNC: 27 U/L
ANION GAP SERPL CALC-SCNC: 16 MMOL/L
AST SERPL-CCNC: 33 U/L
BILIRUB SERPL-MCNC: 0.3 MG/DL
BUN SERPL-MCNC: 16 MG/DL
CALCIUM SERPL-MCNC: 9.7 MG/DL
CHLORIDE SERPL-SCNC: 104 MMOL/L
CO2 SERPL-SCNC: 24 MMOL/L
CREAT SERPL-MCNC: 0.87 MG/DL
EGFRCR SERPLBLD CKD-EPI 2021: 73 ML/MIN/1.73M2
GLUCOSE SERPL-MCNC: 101 MG/DL
POTASSIUM SERPL-SCNC: 5.2 MMOL/L
PROT SERPL-MCNC: 7.4 G/DL
SODIUM SERPL-SCNC: 145 MMOL/L
TSH SERPL-ACNC: 0.38 UIU/ML

## 2025-09-04 PROCEDURE — 99203 OFFICE O/P NEW LOW 30 MIN: CPT

## 2025-09-10 ENCOUNTER — NON-APPOINTMENT (OUTPATIENT)
Age: 65
End: 2025-09-10

## 2025-09-16 ENCOUNTER — APPOINTMENT (OUTPATIENT)
Dept: HEMATOLOGY ONCOLOGY | Facility: CLINIC | Age: 65
End: 2025-09-16
Payer: MEDICARE

## 2025-09-16 VITALS
TEMPERATURE: 98 F | SYSTOLIC BLOOD PRESSURE: 132 MMHG | BODY MASS INDEX: 22.74 KG/M2 | OXYGEN SATURATION: 97 % | HEIGHT: 61.5 IN | RESPIRATION RATE: 18 BRPM | WEIGHT: 122 LBS | HEART RATE: 75 BPM | DIASTOLIC BLOOD PRESSURE: 81 MMHG

## 2025-09-16 DIAGNOSIS — M81.0 AGE-RELATED OSTEOPOROSIS W/OUT CURRENT PATHOLOGICAL FRACTURE: ICD-10-CM

## 2025-09-16 DIAGNOSIS — Z85.3 ENCOUNTER FOR FOLLOW-UP EXAMINATION AFTER COMPLETED TREATMENT FOR MALIGNANT NEOPLASM: ICD-10-CM

## 2025-09-16 DIAGNOSIS — N93.9 ABNORMAL UTERINE AND VAGINAL BLEEDING, UNSPECIFIED: ICD-10-CM

## 2025-09-16 DIAGNOSIS — Z08 ENCOUNTER FOR FOLLOW-UP EXAMINATION AFTER COMPLETED TREATMENT FOR MALIGNANT NEOPLASM: ICD-10-CM

## 2025-09-16 DIAGNOSIS — Z92.29 PERSONAL HISTORY OF OTHER DRUG THERAPY: ICD-10-CM

## 2025-09-16 PROCEDURE — G2211 COMPLEX E/M VISIT ADD ON: CPT

## 2025-09-16 PROCEDURE — 99214 OFFICE O/P EST MOD 30 MIN: CPT
